# Patient Record
Sex: FEMALE | Race: WHITE | NOT HISPANIC OR LATINO | URBAN - METROPOLITAN AREA
[De-identification: names, ages, dates, MRNs, and addresses within clinical notes are randomized per-mention and may not be internally consistent; named-entity substitution may affect disease eponyms.]

---

## 2023-08-03 LAB
AMPHETAMINE (PRESENCE) IN URINE BY SCREEN METHOD: ABNORMAL
BARBITURATES PRESENCE IN URINE BY SCREEN METHOD: ABNORMAL
BENZODIAZEPINE (PRESENCE) IN URINE BY SCREEN METHOD: ABNORMAL
CANNABINOIDS IN URINE BY SCREEN METHOD: ABNORMAL
COCAINE (PRESENCE) IN URINE BY SCREEN METHOD: ABNORMAL
DRUG SCREEN COMMENT URINE: ABNORMAL
FENTANYL URINE: ABNORMAL
METHADONE (PRESENCE) IN URINE BY SCREEN METHOD: ABNORMAL
OPIATES (PRESENCE) IN URINE BY SCREEN METHOD: ABNORMAL
OXYCODONE (PRESENCE) IN URINE BY SCREEN METHOD: ABNORMAL
PHENCYCLIDINE (PRESENCE) IN URINE BY SCREEN METHOD: ABNORMAL

## 2023-08-04 LAB — ETHYL GLUCURONIDE SCREEN: NEGATIVE NG/ML

## 2023-08-07 LAB
11-NOR-9-CARBOXY-THC, URN, QUANT: >500 NG/ML
BENZOYLECGONINE, URINE: >1000 NG/ML

## 2023-08-08 LAB
6-ACETYLMORPHINE: <25 NG/ML
7-AMINOCLONAZEPAM: <25 NG/ML
ALPHA-HYDROXYALPRAZOLAM: <25 NG/ML
ALPHA-HYDROXYMIDAZOLAM: <25 NG/ML
ALPRAZOLAM: <25 NG/ML
CHLORDIAZEPOXIDE: <25 NG/ML
CLONAZEPAM: <25 NG/ML
CODEINE: <50 NG/ML
DIAZEPAM: <25 NG/ML
HYDROCODONE: <25 NG/ML
HYDROMORPHONE: <25 NG/ML
LORAZEPAM: <25 NG/ML
MIDAZOLAM: <25 NG/ML
MORPHINE URINE: <50 NG/ML
NORDIAZEPAM: <25 NG/ML
NORHYDROCODONE: <25 NG/ML
NOROXYCODONE: <25 NG/ML
OXAZEPAM: <25 NG/ML
OXYCODONE: <25 NG/ML
OXYMORPHONE: <25 NG/ML
TEMAZEPAM: <25 NG/ML

## 2024-02-05 ENCOUNTER — TELEMEDICINE (OUTPATIENT)
Dept: BEHAVIORAL HEALTH | Facility: CLINIC | Age: 44
End: 2024-02-05
Payer: COMMERCIAL

## 2024-02-05 DIAGNOSIS — Z79.899 MEDICATION MANAGEMENT: ICD-10-CM

## 2024-02-05 DIAGNOSIS — F10.21 ALCOHOL USE DISORDER, MODERATE, IN SUSTAINED REMISSION (MULTI): ICD-10-CM

## 2024-02-05 DIAGNOSIS — G47.00 INSOMNIA, UNSPECIFIED TYPE: ICD-10-CM

## 2024-02-05 DIAGNOSIS — F41.1 GAD (GENERALIZED ANXIETY DISORDER): ICD-10-CM

## 2024-02-05 DIAGNOSIS — F33.1 MODERATE EPISODE OF RECURRENT MAJOR DEPRESSIVE DISORDER (MULTI): ICD-10-CM

## 2024-02-05 DIAGNOSIS — F43.10 PTSD (POST-TRAUMATIC STRESS DISORDER): ICD-10-CM

## 2024-02-05 DIAGNOSIS — F17.210 CIGARETTE NICOTINE DEPENDENCE WITHOUT COMPLICATION: ICD-10-CM

## 2024-02-05 DIAGNOSIS — F14.20 COCAINE USE DISORDER, SEVERE, DEPENDENCE (MULTI): ICD-10-CM

## 2024-02-05 PROBLEM — F31.9 BIPOLAR AND RELATED DISORDER (MULTI): Status: ACTIVE | Noted: 2024-02-05

## 2024-02-05 PROCEDURE — 99214 OFFICE O/P EST MOD 30 MIN: CPT | Performed by: NURSE PRACTITIONER

## 2024-02-05 RX ORDER — SERTRALINE HYDROCHLORIDE 50 MG/1
50 TABLET, FILM COATED ORAL DAILY
Qty: 14 TABLET | Refills: 0 | Status: SHIPPED | OUTPATIENT
Start: 2024-02-05 | End: 2024-05-01 | Stop reason: WASHOUT

## 2024-02-05 RX ORDER — DULOXETINE 40 MG/1
80 CAPSULE, DELAYED RELEASE ORAL DAILY
Qty: 180 CAPSULE | Refills: 3 | Status: SHIPPED | OUTPATIENT
Start: 2024-02-05 | End: 2024-05-01 | Stop reason: SDUPTHER

## 2024-02-05 RX ORDER — MIRTAZAPINE 15 MG/1
15 TABLET, FILM COATED ORAL NIGHTLY
Qty: 90 TABLET | Refills: 3 | Status: SHIPPED | OUTPATIENT
Start: 2024-02-05 | End: 2024-05-01 | Stop reason: SDUPTHER

## 2024-02-05 RX ORDER — LAMOTRIGINE 100 MG/1
100 TABLET ORAL 2 TIMES DAILY
Qty: 180 TABLET | Refills: 3 | Status: SHIPPED | OUTPATIENT
Start: 2024-02-05 | End: 2024-05-01 | Stop reason: SDUPTHER

## 2024-02-05 RX ORDER — TRAZODONE HYDROCHLORIDE 50 MG/1
25-100 TABLET ORAL NIGHTLY PRN
Qty: 180 TABLET | Refills: 3 | Status: SHIPPED | OUTPATIENT
Start: 2024-02-05 | End: 2024-05-01 | Stop reason: SDUPTHER

## 2024-02-05 ASSESSMENT — ENCOUNTER SYMPTOMS: CONSTITUTIONAL NEGATIVE: 1
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
## 2024-05-01 ENCOUNTER — TELEMEDICINE (OUTPATIENT)
Dept: BEHAVIORAL HEALTH | Facility: CLINIC | Age: 44
End: 2024-05-01
Payer: COMMERCIAL

## 2024-05-01 DIAGNOSIS — F17.210 CIGARETTE NICOTINE DEPENDENCE WITHOUT COMPLICATION: ICD-10-CM

## 2024-05-01 DIAGNOSIS — F43.10 PTSD (POST-TRAUMATIC STRESS DISORDER): Primary | ICD-10-CM

## 2024-05-01 DIAGNOSIS — F10.21 ALCOHOL USE DISORDER, MODERATE, IN SUSTAINED REMISSION (MULTI): ICD-10-CM

## 2024-05-01 DIAGNOSIS — F33.1 MODERATE EPISODE OF RECURRENT MAJOR DEPRESSIVE DISORDER (MULTI): ICD-10-CM

## 2024-05-01 DIAGNOSIS — F41.1 GAD (GENERALIZED ANXIETY DISORDER): ICD-10-CM

## 2024-05-01 DIAGNOSIS — F14.20 COCAINE USE DISORDER, SEVERE, DEPENDENCE (MULTI): ICD-10-CM

## 2024-05-01 DIAGNOSIS — Z79.899 MEDICATION MANAGEMENT: ICD-10-CM

## 2024-05-01 DIAGNOSIS — G47.00 INSOMNIA, UNSPECIFIED TYPE: ICD-10-CM

## 2024-05-01 PROCEDURE — 99214 OFFICE O/P EST MOD 30 MIN: CPT | Performed by: NURSE PRACTITIONER

## 2024-05-01 PROCEDURE — 4004F PT TOBACCO SCREEN RCVD TLK: CPT | Performed by: NURSE PRACTITIONER

## 2024-05-01 RX ORDER — MIRTAZAPINE 15 MG/1
15 TABLET, FILM COATED ORAL NIGHTLY
Qty: 90 TABLET | Refills: 3 | Status: SHIPPED | OUTPATIENT
Start: 2024-05-01 | End: 2025-05-01

## 2024-05-01 RX ORDER — DULOXETINE 40 MG/1
80 CAPSULE, DELAYED RELEASE ORAL DAILY
Qty: 180 CAPSULE | Refills: 3 | Status: SHIPPED | OUTPATIENT
Start: 2024-05-01 | End: 2025-05-01

## 2024-05-01 RX ORDER — TRAZODONE HYDROCHLORIDE 50 MG/1
25-100 TABLET ORAL NIGHTLY PRN
Qty: 180 TABLET | Refills: 3 | Status: SHIPPED | OUTPATIENT
Start: 2024-05-01 | End: 2025-05-01

## 2024-05-01 RX ORDER — LAMOTRIGINE 100 MG/1
100 TABLET ORAL 2 TIMES DAILY
Qty: 180 TABLET | Refills: 3 | Status: SHIPPED | OUTPATIENT
Start: 2024-05-01 | End: 2025-05-01

## 2024-05-01 ASSESSMENT — ENCOUNTER SYMPTOMS: CONSTITUTIONAL NEGATIVE: 1
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
## 2024-05-06 ENCOUNTER — APPOINTMENT (OUTPATIENT)
Dept: BEHAVIORAL HEALTH | Facility: CLINIC | Age: 44
End: 2024-05-06
Payer: COMMERCIAL

## 2024-06-27 ENCOUNTER — TELEPHONE (OUTPATIENT)
Dept: BEHAVIORAL HEALTH | Facility: CLINIC | Age: 44
End: 2024-06-27

## 2024-07-09 ENCOUNTER — APPOINTMENT (OUTPATIENT)
Dept: BEHAVIORAL HEALTH | Facility: CLINIC | Age: 44
End: 2024-07-09
Payer: COMMERCIAL

## 2024-07-23 ENCOUNTER — OFFICE VISIT (OUTPATIENT)
Dept: ORTHOPEDIC SURGERY | Facility: CLINIC | Age: 44
End: 2024-07-23
Payer: COMMERCIAL

## 2024-07-23 ENCOUNTER — HOSPITAL ENCOUNTER (OUTPATIENT)
Dept: RADIOLOGY | Facility: CLINIC | Age: 44
Discharge: HOME | End: 2024-07-23
Payer: COMMERCIAL

## 2024-07-23 VITALS — BODY MASS INDEX: 22.2 KG/M2 | WEIGHT: 130 LBS | HEIGHT: 64 IN

## 2024-07-23 DIAGNOSIS — M25.561 ACUTE PAIN OF RIGHT KNEE: ICD-10-CM

## 2024-07-23 DIAGNOSIS — M17.11 ARTHRITIS OF RIGHT KNEE: Primary | ICD-10-CM

## 2024-07-23 PROCEDURE — 73562 X-RAY EXAM OF KNEE 3: CPT | Mod: RIGHT SIDE | Performed by: RADIOLOGY

## 2024-07-23 PROCEDURE — 73562 X-RAY EXAM OF KNEE 3: CPT | Mod: RT

## 2024-07-23 PROCEDURE — 99203 OFFICE O/P NEW LOW 30 MIN: CPT

## 2024-07-23 PROCEDURE — 3008F BODY MASS INDEX DOCD: CPT

## 2024-07-23 PROCEDURE — L1812 KO ELASTIC W/JOINTS PRE OTS: HCPCS

## 2024-07-23 PROCEDURE — 20610 DRAIN/INJ JOINT/BURSA W/O US: CPT

## 2024-07-23 PROCEDURE — 4004F PT TOBACCO SCREEN RCVD TLK: CPT

## 2024-07-23 RX ORDER — TRIAMCINOLONE ACETONIDE 40 MG/ML
40 INJECTION, SUSPENSION INTRA-ARTICULAR; INTRAMUSCULAR
Status: COMPLETED | OUTPATIENT
Start: 2024-07-23 | End: 2024-07-23

## 2024-07-23 RX ORDER — LIDOCAINE HYDROCHLORIDE 20 MG/ML
2 INJECTION, SOLUTION INFILTRATION; PERINEURAL
Status: COMPLETED | OUTPATIENT
Start: 2024-07-23 | End: 2024-07-23

## 2024-07-23 ASSESSMENT — ENCOUNTER SYMPTOMS: KNEE SWELLING: 1

## 2024-07-23 NOTE — PROGRESS NOTES
Subjective    Patient ID: Nicci Ortiz is a 44 y.o. female.    Chief Complaint: Pain of the Right Knee     Right Knee       This is a pleasant 44-year-old female presenting to the office for evaluation of right knee pain, which has been ongoing intermittently for approximately 5 to 6 years, worsening over the last few weeks.  Patient states that in the past, she has experienced lateral right knee Subluxation.  She did perform formal physical therapy at that time, and states that she has not had any recent subluxation of her kneecap.  There has been no recent injury.  She points to her kneecap and medial aspect of the knee when describing the pain.  Pain is described as a constant dull ache, sharp shooting at times.  Pain is worse when going up and down stairs, bending or squatting, or kneeling directly onto her knee.  She has been taking ibuprofen and Tylenol as well as applying heat and ice with minimal relief of symptoms.  She does notice intermittent swelling at times.  Denies recent redness or warmth to right knee.  She is an [employer] .    The patient's past medical, surgical, family, and social history as well as allergies and medications were reviewed and updated in the chart.    Objective   Ortho Exam  Pleasant and no acute distress. Walks with a mildly antalgic gait.  Right knee appearing without soft tissue swelling erythema or ecchymosis.  There is no warmth upon touch and skin is intact.  Patellofemoral crepitus no with range of motion testing.  No patellar apprehension.  Right knee range of motion is 0-110°. The knee is stable to varus and valgus stress Lachman and posterior drawer. There is a mild effusion. There is generalized tenderness surrounding kneecap and medial joint line. Left knee range of motion is 0-120°. There is no effusion. The knee is stable to varus and valgus stress Lachman and posterior drawer. Both lower extremities are well perfused the skin is intact and muscle tone is  adequate.    Image Results:  Multiple view x-rays of the right knee obtained today personally reviewed, without evidence of acute fracture or dislocation.  There are degenerative changes of the right knee with joint space narrowing noted in medial and patellofemoral compartments.    Assessment/Plan   Encounter Diagnoses:  Arthritis of right knee    Acute pain of right knee     Plan: Discussion with patient about previous knee subluxation and right knee arthritis with review of today's x-rays.  Conservative treatment options were discussed at length.  She will benefit from a formal physical therapy program to help with shoulder quad strengthening, right knee strengthening and range of motion, referral provided.  After the risks and benefits of a right knee intra-articular steroid injection of Kenalog/lidocaine was discussed to help decrease pain and inflammation, patient agreed to injection and tolerated well.  She is aware that injection could take up to 2 weeks to take full effect.  She can receive these injections every 3 months as needed.  She can continue with ibuprofen and Tylenol as well as heat and ice application as needed.  She was provided a Harjinder pull knee brace to help with knee Stability.  She can follow-up as symptoms dictate.    L Inj/Asp: R knee on 7/23/2024 11:10 AM  Indications: pain  Details: 22 G needle, superolateral approach  Medications: 40 mg triamcinolone acetonide 40 mg/mL; 2 mL lidocaine 20 mg/mL (2 %)  Procedure, treatment alternatives, risks and benefits explained, specific risks discussed. Consent was given by the patient.                Orders Placed This Encounter    Knee brace    XR knee right 3 views    Referral to Physical Therapy     No follow-ups on file.

## 2024-07-29 ENCOUNTER — APPOINTMENT (OUTPATIENT)
Dept: BEHAVIORAL HEALTH | Facility: CLINIC | Age: 44
End: 2024-07-29
Payer: COMMERCIAL

## 2024-07-29 DIAGNOSIS — F31.9 BIPOLAR AND RELATED DISORDER (MULTI): ICD-10-CM

## 2024-07-29 DIAGNOSIS — F43.10 PTSD (POST-TRAUMATIC STRESS DISORDER): ICD-10-CM

## 2024-07-29 PROCEDURE — 99214 OFFICE O/P EST MOD 30 MIN: CPT | Performed by: PSYCHIATRY & NEUROLOGY

## 2024-07-29 RX ORDER — DIVALPROEX SODIUM 250 MG/1
250 TABLET, DELAYED RELEASE ORAL 2 TIMES DAILY
Qty: 60 TABLET | Refills: 2 | Status: SHIPPED | OUTPATIENT
Start: 2024-07-29 | End: 2024-10-27

## 2024-07-29 ASSESSMENT — ENCOUNTER SYMPTOMS: SLEEP DISTURBANCE: 1

## 2024-07-29 NOTE — PROGRESS NOTES
"Subjective   Patient ID: Nicci Ortiz is a 44 y.o. female who presents for med management visit.  HPI Virtual or Telephone Consent    An interactive audio and video telecommunication system which permits real time communications between the patient (at the originating site) and provider (at the distant site) was utilized to provide this telehealth service.   Verbal consent was requested and obtained from Nicci Ortiz on this date, 07/29/24 for a telehealth visit.  Patient resuming care with me.  Patient has been having great difficulty with the \"0-60\" phenomena.  Usually little tiny things trigger major tirade's and she has tried many coping skills meditation etc. to deal with this.  Reviewed interval psych history and meds.  Taking Cymbalta 40 mg twice daily and Lamictal 100 mg twice daily.  Remeron was added for sleep was only helpful for about 3 weeks and is not working now.  Going to [facility] to see if she can do an IOP there.  The thought of using cocaine enters her mind when she is overwhelmed and anxious.  Has not started using cocaine.    Review of Systems   Psychiatric/Behavioral:  Positive for sleep disturbance.        Objective   Physical Exam  Psychiatric:         Attention and Perception: Attention and perception normal.         Mood and Affect: Mood and affect normal.         Speech: Speech normal.         Behavior: Behavior is cooperative.         Thought Content: Thought content normal.         Cognition and Memory: Cognition and memory normal.         Judgment: Judgment normal.         Assessment/Plan   Problem List Items Addressed This Visit             ICD-10-CM    Bipolar and related disorder (Multi) F31.9     Trial Depakote 250 mg twice daily.  This is for the quick anger response to minimal stimuli.  Mirtazapine will be discontinued.  Follow-up 6 weeks         PTSD (post-traumatic stress disorder) F43.10    Relevant Medications    divalproex (Depakote) 250 mg EC tablet            Adama RIVERA" MD Debra 07/29/24 1:45 PM

## 2024-07-29 NOTE — ASSESSMENT & PLAN NOTE
Trial Depakote 250 mg twice daily.  This is for the quick anger response to minimal stimuli.  Mirtazapine will be discontinued.  Follow-up 6 weeks

## 2024-09-09 ENCOUNTER — APPOINTMENT (OUTPATIENT)
Dept: BEHAVIORAL HEALTH | Facility: CLINIC | Age: 44
End: 2024-09-09
Payer: COMMERCIAL

## 2024-10-08 ENCOUNTER — TELEPHONE (OUTPATIENT)
Dept: BEHAVIORAL HEALTH | Facility: CLINIC | Age: 44
End: 2024-10-08
Payer: COMMERCIAL

## 2024-10-08 DIAGNOSIS — G47.00 INSOMNIA, UNSPECIFIED TYPE: ICD-10-CM

## 2024-10-08 RX ORDER — TRAZODONE HYDROCHLORIDE 50 MG/1
25-100 TABLET ORAL NIGHTLY PRN
Qty: 60 TABLET | Refills: 0 | Status: SHIPPED | OUTPATIENT
Start: 2024-10-08 | End: 2024-11-07

## 2024-10-08 NOTE — TELEPHONE ENCOUNTER
----- Message from Kaley Aguilar sent at 10/8/2024  1:57 PM EDT -----  Regarding: trazadone  Buzz Brooks, This is a patient of Dr Richardson who previously saw Quan Jeter. She sent Dr Richardson an email looking for trazadone to help her sleep. Quan had prescribed in the past. Please advise.   Thank you I can call her with your response.

## 2024-10-24 ENCOUNTER — APPOINTMENT (OUTPATIENT)
Dept: ORTHOPEDIC SURGERY | Facility: CLINIC | Age: 44
End: 2024-10-24
Payer: COMMERCIAL

## 2024-10-28 ENCOUNTER — OFFICE VISIT (OUTPATIENT)
Dept: ORTHOPEDIC SURGERY | Facility: CLINIC | Age: 44
End: 2024-10-28
Payer: COMMERCIAL

## 2024-10-28 ENCOUNTER — APPOINTMENT (OUTPATIENT)
Dept: BEHAVIORAL HEALTH | Facility: CLINIC | Age: 44
End: 2024-10-28
Payer: COMMERCIAL

## 2024-10-28 DIAGNOSIS — M54.2 NECK PAIN: ICD-10-CM

## 2024-10-28 DIAGNOSIS — M25.561 ACUTE PAIN OF RIGHT KNEE: ICD-10-CM

## 2024-10-28 DIAGNOSIS — M17.11 ARTHRITIS OF RIGHT KNEE: ICD-10-CM

## 2024-10-28 PROCEDURE — 2500000004 HC RX 250 GENERAL PHARMACY W/ HCPCS (ALT 636 FOR OP/ED)

## 2024-10-28 PROCEDURE — 99213 OFFICE O/P EST LOW 20 MIN: CPT | Mod: 25

## 2024-10-28 PROCEDURE — 20610 DRAIN/INJ JOINT/BURSA W/O US: CPT | Mod: RT

## 2024-10-28 PROCEDURE — 4004F PT TOBACCO SCREEN RCVD TLK: CPT

## 2024-10-28 PROCEDURE — 99213 OFFICE O/P EST LOW 20 MIN: CPT

## 2024-10-28 RX ORDER — METHYLPREDNISOLONE 4 MG/1
TABLET ORAL
Qty: 21 TABLET | Refills: 0 | Status: SHIPPED | OUTPATIENT
Start: 2024-10-28

## 2024-10-28 RX ORDER — TRIAMCINOLONE ACETONIDE 40 MG/ML
40 INJECTION, SUSPENSION INTRA-ARTICULAR; INTRAMUSCULAR
Status: COMPLETED | OUTPATIENT
Start: 2024-10-28 | End: 2024-10-28

## 2024-10-28 RX ORDER — LIDOCAINE HYDROCHLORIDE 20 MG/ML
2 INJECTION, SOLUTION INFILTRATION; PERINEURAL
Status: COMPLETED | OUTPATIENT
Start: 2024-10-28 | End: 2024-10-28

## 2024-10-28 ASSESSMENT — ENCOUNTER SYMPTOMS: KNEE SWELLING: 1

## 2025-02-10 ENCOUNTER — APPOINTMENT (OUTPATIENT)
Dept: BEHAVIORAL HEALTH | Facility: CLINIC | Age: 45
End: 2025-02-10
Payer: COMMERCIAL

## 2025-02-10 DIAGNOSIS — F33.1 MODERATE EPISODE OF RECURRENT MAJOR DEPRESSIVE DISORDER: ICD-10-CM

## 2025-02-10 DIAGNOSIS — G47.00 INSOMNIA, UNSPECIFIED TYPE: ICD-10-CM

## 2025-02-10 DIAGNOSIS — F43.10 PTSD (POST-TRAUMATIC STRESS DISORDER): ICD-10-CM

## 2025-02-10 DIAGNOSIS — F41.1 GAD (GENERALIZED ANXIETY DISORDER): ICD-10-CM

## 2025-02-10 DIAGNOSIS — F31.9 BIPOLAR AND RELATED DISORDER (MULTI): ICD-10-CM

## 2025-02-10 PROCEDURE — 99214 OFFICE O/P EST MOD 30 MIN: CPT | Performed by: PSYCHIATRY & NEUROLOGY

## 2025-02-10 RX ORDER — LAMOTRIGINE 100 MG/1
100 TABLET ORAL 3 TIMES DAILY
Qty: 90 TABLET | Refills: 3 | Status: SHIPPED | OUTPATIENT
Start: 2025-02-10

## 2025-02-10 RX ORDER — DULOXETINE 40 MG/1
80 CAPSULE, DELAYED RELEASE ORAL DAILY
Qty: 180 CAPSULE | Refills: 3 | Status: SHIPPED | OUTPATIENT
Start: 2025-02-10 | End: 2026-02-10

## 2025-02-10 NOTE — PROGRESS NOTES
Initial psychiatric evaluation  Patient ID: Nicci Ortiz is a 44 y.o. female who presents for medication management and establishment of care.  Patient seen through the use of audiovisual interactive technology.  Patient's location-Home.  Provider's location-[place], [place].  Patient also gave verbal permission to participate in telemedicine evaluation on 2/10/2025.    HPI  Patient reports the last time that she was seen by Dr. Richardson was 12/2024.  There is no record of this encounter.  Per chart review, patient last saw Dr. Richardson in July 2024.  She reported initially following with Dr. Richardson since 2010.  She was then transferred to another provider.  Patient subsequently transferred back to Dr. Richardson.  Patient has a diagnosis of bipolar disorder and PTSD.  Patient is on Cymbalta for total of 80 mg p.o. daily.  Patient is also on Lamictal for a total of 200 mg/day.  Patient reports these as ongoing, chronic/maintenance medications.  In the past patient has been on Remeron for sleep.  There was also an indication of starting Depakote for patient however she did not take it and she is currently not on Remeron or Depakote.  Patient is on trazodone for sleep.  She states that she cannot sleep without it.  Patient reports trying various sleep techniques in order to get to sleep and has not been able to.  Patient described a 0-60 phenomenon to Dr. Richardson.  She reports that she will yell and become extremely irritable in a short period of time.  She scares her family members.  She denies any SI/HI.  She is open to medication recommendations and adjustments.      Review of Systems  BH Psych Review of Symptoms  Patient reports her mood as depressed.  She is tearful.  She reports some baseline irritability.  Patient reports sleep with trazodone.  Patient states that the medication knocks her out for approximately 6 to 7 hours.  Otherwise patient has difficulty falling asleep.  Appetite is increased.  Patient reports that  she has gained approximately 10 pounds over the past 6 months.  She presents with a positive attitude towards healthy weight gain.  Patient reports her energy level as decreased.  She reports feeling guilty that she is not able to do things around the house.  Patient states that bipolar like symptoms with mood lability, irritability, decreased sleep- these are frequent.  She denies any SI/intent/plan.  She denies any homicidal ideations.  No perceptual disturbances in the form of auditory or visual hallucinations and no paranoid ideation/delusions.    Past psychiatric history is significant for PTSD, bipolar disorder, insomnia, polysubstance use disorder in remission.  Patient reports 1 psychiatric hospitalization and 1 suicide attempt.  She denies any self-harm behaviors.  She reports psychotherapy after her mother passed away.  She also reports having IOP but not being able to complete secondary to financial constraints.    Past medical history is noncontributory per patient    Medications-see above    Allergies-reviewed with patient    Chemical dependency history patient reports that she is in recovery from cocaine use.  She denies any alcohol use.  She reports history of THC and tobacco use.  Patient also uses caffeinated sodas.    Social history-patient reports that she lives with her .  She has an 11-year-old daughter.  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  Patient reports her support as low.  She does not have a sponsor.  She reports being outside of the home as a coping strategy.  She states when she is able to spend time out of the home with her daughter she is in a good place.  Otherwise support is overall limited.    Objective   Physical Exam N/AA  Mental status exam  Appearance-appropriate for setting.  Engaged in evaluation.  Eye contact intact.  Mildly tearful.  Behavior-calm, cooperative.  No psychomotor agitation or slowing  Mood-mildly depressed  Affect-reactive, jovial  Speech-regular rate rhythm and  tone  Thought gwwdyab-bnqs-icsmqrqq, linear  Thought content-no SI/HI, no perceptual disturbances, no paranoid ideations or delusions outside of a social setting.  Cognition-grossly intact  Insight-Limited to fair  Judgment/limited to fair    Labs-no new labs    Assessment/Plan   Diagnoses and all orders for this visit:  Insomnia, unspecified type  -     Referral to Psychology; Future   -Continue trazodone as prescribed-current dose  PTSD (post-traumatic stress disorder)  -     lamoTRIgine (LaMICtal) 100 mg tablet; Take 1 tablet (100 mg) by mouth 3 times a day.  -     Referral to Psychology; Future  -     DULoxetine 40 mg DR capsule; Take 2 capsules (80 mg) by mouth once daily.    Bipolar and related disorder (Multi)   -Increase Lamictal to 100 mg p.o. 3 times daily    GEORGE (generalized anxiety disorder)  -     Referral to Psychology; Future  -     DULoxetine 40 mg DR capsule; Take 2 capsules (80 mg) by mouth once daily.    Moderate episode of recurrent major depressive disorder  -     lamoTRIgine (LaMICtal) 100 mg tablet; Take 1 tablet (100 mg) by mouth 3 times a day.  -     Referral to Psychology; Future  -     DULoxetine 40 mg DR capsule; Take 2 capsules (80 mg) by mouth once daily.   -Continue trazodone as prescribed a current dose    Return to [facility] in approximately 3 weeks

## 2025-03-17 ENCOUNTER — APPOINTMENT (OUTPATIENT)
Facility: CLINIC | Age: 45
End: 2025-03-17
Payer: COMMERCIAL

## 2025-03-17 VITALS
OXYGEN SATURATION: 98 % | RESPIRATION RATE: 18 BRPM | HEART RATE: 76 BPM | HEIGHT: 64 IN | SYSTOLIC BLOOD PRESSURE: 113 MMHG | WEIGHT: 118 LBS | BODY MASS INDEX: 20.14 KG/M2 | DIASTOLIC BLOOD PRESSURE: 67 MMHG

## 2025-03-17 DIAGNOSIS — J41.8 MIXED SIMPLE AND MUCOPURULENT CHRONIC BRONCHITIS (MULTI): Primary | ICD-10-CM

## 2025-03-17 DIAGNOSIS — F17.218 CIGARETTE NICOTINE DEPENDENCE WITH OTHER NICOTINE-INDUCED DISORDER: ICD-10-CM

## 2025-03-17 PROCEDURE — 3008F BODY MASS INDEX DOCD: CPT | Performed by: INTERNAL MEDICINE

## 2025-03-17 PROCEDURE — 99204 OFFICE O/P NEW MOD 45 MIN: CPT | Performed by: INTERNAL MEDICINE

## 2025-03-17 RX ORDER — ALBUTEROL SULFATE 90 UG/1
INHALANT RESPIRATORY (INHALATION)
COMMUNITY
Start: 2025-03-04

## 2025-03-17 RX ORDER — ALBUTEROL SULFATE 90 UG/1
1 INHALANT RESPIRATORY (INHALATION) ONCE
OUTPATIENT
Start: 2025-03-17

## 2025-03-17 RX ORDER — ALBUTEROL SULFATE 0.83 MG/ML
3 SOLUTION RESPIRATORY (INHALATION) ONCE
OUTPATIENT
Start: 2025-03-17 | End: 2025-03-17

## 2025-03-17 ASSESSMENT — COPD QUESTIONNAIRES
QUESTION6_LEAVINGHOUSE: 0 - I AM CONFIDENT LEAVING MY HOME DESPITE MY LUNG CONDITION
QUESTION1_COUGHFREQUENCY: 4
QUESTION5_HOMEACTIVITIES: 3
CAT_TOTALSCORE: 20
QUESTION3_CHESTTIGHTNESS: 0 - MY CHEST DOES NOT FEEL TIGHT AT ALL
QUESTION2_CHESTPHLEGM: 3
QUESTION4_WALKINCLINE: 2
QUESTION8_ENERGYLEVEL: 3
QUESTION7_SLEEPQUALITY: 5 - I DON'T SLEEP SOUNDLY BECAUSE OF MY LUNG CONDITION

## 2025-03-17 ASSESSMENT — PATIENT HEALTH QUESTIONNAIRE - PHQ9
SUM OF ALL RESPONSES TO PHQ9 QUESTIONS 1 AND 2: 2
10. IF YOU CHECKED OFF ANY PROBLEMS, HOW DIFFICULT HAVE THESE PROBLEMS MADE IT FOR YOU TO DO YOUR WORK, TAKE CARE OF THINGS AT HOME, OR GET ALONG WITH OTHER PEOPLE: SOMEWHAT DIFFICULT
2. FEELING DOWN, DEPRESSED OR HOPELESS: SEVERAL DAYS
1. LITTLE INTEREST OR PLEASURE IN DOING THINGS: SEVERAL DAYS

## 2025-03-17 ASSESSMENT — COLUMBIA-SUICIDE SEVERITY RATING SCALE - C-SSRS
2. HAVE YOU ACTUALLY HAD ANY THOUGHTS OF KILLING YOURSELF?: NO
1. IN THE PAST MONTH, HAVE YOU WISHED YOU WERE DEAD OR WISHED YOU COULD GO TO SLEEP AND NOT WAKE UP?: NO
6. HAVE YOU EVER DONE ANYTHING, STARTED TO DO ANYTHING, OR PREPARED TO DO ANYTHING TO END YOUR LIFE?: NO

## 2025-03-17 NOTE — PROGRESS NOTES
Department of Medicine  Division of Pulmonary, Critical Care, and Sleep Medicine  Consultation  [facility] - [address], [address]    I was asked by Dr. Milton, to evaluate Ms. Nicci Ortiz for chronic bronchitis. I have independently interviewed and examined the patient in the office and reviewed available records.    Physician HPI:  Mrs. Ortiz is a 45-year-old woman with past medical history of nicotine dependence (25-pack-year history) who presented to the office today to publish care for a chronic bronchitis.  She recently presented to [facility] clinic with acute shortness of breath, cough and wheezing where she was told to have pneumonia and was treated with antibiotic and a short course of corticosteroids.  She was prescribed albuterol to use as needed.  She reports long history of chronic bronchitis over the past 15 years.  She is currently smoking half a pack of cigarettes every day.  Respiratory status has improved after corticosteroids.  She would need to use albuterol a few times per week but not every day.  Denies purulent sputum production recurrent fevers.  No acute chest pain.  Her resting room air O2 saturation is 98%.    Review of Systems   All other review of systems are negative and/or non-contributory.      Immunizations:  Immunization History   Administered Date(s) Administered    COVID-19, mRNA, LNP-S, PF, 30 mcg/0.3 mL dose 05/16/2021, 06/06/2021    Flu vaccine (IIV4), preservative free *Check age/dose* 10/02/2018, 10/17/2019    Flu vaccine, quadrivalent, no egg protein, age 6 month or greater (FLUCELVAX) 10/31/2023    Influenza, Unspecified 10/22/2019    Influenza, injectable, quadrivalent 09/20/2021    MMR vaccine, subcutaneous (MMR II) 02/28/2022    Pfizer Purple Cap SARS-CoV-2 12/23/2021    Tdap vaccine, age 7 year and older (BOOSTRIX, ADACEL) 05/29/2013, 06/30/2022       Past Medical History:  No past medical history on file.    Past Surgical History:  No past surgical history on  "file.    Family History:  No family history on file.    Social History:  Social History     Tobacco Use    Smoking status: Every Day     Current packs/day: 1.00     Average packs/day: 1 pack/day for 27.0 years (27.0 ttl pk-yrs)     Types: Cigarettes    Smokeless tobacco: Never   Vaping Use    Vaping status: Never Used   Substance Use Topics    Alcohol use: Never    Drug use: Never        Occupational & Environmental History:   [employer]     Medications:  Current Outpatient Medications   Medication Instructions    albuterol 90 mcg/actuation inhaler INHALE 2 PUFFS EVERY 4 HOURS AS NEEDED FOR WHEEZING FOR UP TO 30 DAYS.    DULoxetine 80 mg, oral, Daily    lamoTRIgine (LAMICTAL) 100 mg, oral, 3 times daily    methylPREDNISolone (Medrol Dospak) 4 mg tablets Use as directed by package instructions    tiotropium-olodateroL (Stiolto Respimat) 2.5-2.5 mcg/actuation mist inhaler 2 Inhalations, inhalation, Daily    traZODone (DESYREL)  mg, oral, Nightly PRN        Drug Allergies/Intolerances:  Allergies   Allergen Reactions    Amoxicillin Hives    Fentanyl Nausea/vomiting     Muscle weakness/spasm    Other reaction(s): Other (See Comments)   Muscle weakness/spasm    Moxifloxacin GI Upset     Other reaction(s): GI Upset   Yeast infection    Yeast infection    Risperidone Analogues GI Upset and Other     muscle seizing    Other reaction(s): GI Intolerance   Muscle spams    Muscle spams            Visit Vitals  /67   Pulse 76   Resp 18   Ht 1.626 m (5' 4\")   Wt 53.5 kg (118 lb)   LMP 03/03/2025   SpO2 98%   BMI 20.25 kg/m²   OB Status Having periods   Smoking Status Every Day   BSA 1.55 m²        Physical Exam  Vitals and nursing note reviewed.   Constitutional:       General: She is not in acute distress.  HENT:      Head: Normocephalic and atraumatic.      Mouth/Throat:      Pharynx: Oropharynx is clear.      Comments: No thrush  Cardiovascular:      Rate and Rhythm: Normal rate.   Pulmonary:      " "Effort: Pulmonary effort is normal. No respiratory distress.      Breath sounds: No stridor. Wheezing present. No rhonchi.   Neurological:      General: No focal deficit present.      Mental Status: She is alert and oriented to person, place, and time. Mental status is at baseline.          Pulmonary Function Test Results       Chest Radiograph     No results found for this or any previous visit from the past 2000 days.      Echocardiogram     No results found for this or any previous visit from the past 365 days.       Chest CT Scan     No results found for this or any previous visit from the past 365 days.       Laboratory Studies     Lab Results   Component Value Date    WBC 6.8 02/07/2019    HGB 13.2 02/07/2019    HCT 39.6 02/07/2019    MCV 95 02/07/2019     (H) 02/07/2019      Lab Results   Component Value Date    GLUCOSE 81 02/07/2019    CALCIUM 9.4 02/07/2019     02/07/2019    K 3.1 (L) 02/07/2019    CO2 29 02/07/2019     02/07/2019    BUN 6 02/07/2019    CREATININE 0.66 02/07/2019      No results found for: \"ALT\", \"AST\", \"GGT\", \"ALKPHOS\", \"BILITOT\"     Protime   Date/Time Value Ref Range Status   02/07/2019 07:15 PM 11.4 9.7 - 12.7 sec Final     INR   Date/Time Value Ref Range Status   02/07/2019 07:15 PM 1.0 0.9 - 1.1 Final         Assessment and Plan / Recommendations     COPD w/ symptoms of chronic bronchitis - no PFTs in the records  Nicotine dependence    Plan:  Complete PFTs  Start LABA/LAMA + Albuterol as needed. A sample of Stiolto is provided today.  Smoking cessation counseling is done today  CXR PA/Lateral  Follow up in 3-4 months     Please excuse any misspellings or unintended errors related to the Dragon speech recognition software used to dictate this note.    Nikole Arceo MD  03/17/2025  "

## 2025-03-18 LAB
ALBUMIN SERPL-MCNC: 4.2 G/DL (ref 3.6–5.1)
ALP SERPL-CCNC: 57 U/L (ref 31–125)
ALT SERPL-CCNC: 10 U/L (ref 6–29)
ANION GAP SERPL CALCULATED.4IONS-SCNC: 9 MMOL/L (CALC) (ref 7–17)
AST SERPL-CCNC: 18 U/L (ref 10–35)
BASOPHILS # BLD AUTO: 49 CELLS/UL (ref 0–200)
BASOPHILS NFR BLD AUTO: 1 %
BILIRUB SERPL-MCNC: 0.4 MG/DL (ref 0.2–1.2)
BUN SERPL-MCNC: 10 MG/DL (ref 7–25)
CALCIUM SERPL-MCNC: 9 MG/DL (ref 8.6–10.2)
CHLORIDE SERPL-SCNC: 103 MMOL/L (ref 98–110)
CO2 SERPL-SCNC: 28 MMOL/L (ref 20–32)
CREAT SERPL-MCNC: 0.6 MG/DL (ref 0.5–0.99)
EGFRCR SERPLBLD CKD-EPI 2021: 113 ML/MIN/1.73M2
EOSINOPHIL # BLD AUTO: 69 CELLS/UL (ref 15–500)
EOSINOPHIL NFR BLD AUTO: 1.4 %
ERYTHROCYTE [DISTWIDTH] IN BLOOD BY AUTOMATED COUNT: 16 % (ref 11–15)
GLUCOSE SERPL-MCNC: 93 MG/DL (ref 65–99)
HCT VFR BLD AUTO: 41 % (ref 35–45)
HGB BLD-MCNC: 12.8 G/DL (ref 11.7–15.5)
LYMPHOCYTES # BLD AUTO: 1441 CELLS/UL (ref 850–3900)
LYMPHOCYTES NFR BLD AUTO: 29.4 %
MCH RBC QN AUTO: 30 PG (ref 27–33)
MCHC RBC AUTO-ENTMCNC: 31.2 G/DL (ref 32–36)
MCV RBC AUTO: 96 FL (ref 80–100)
MONOCYTES # BLD AUTO: 485 CELLS/UL (ref 200–950)
MONOCYTES NFR BLD AUTO: 9.9 %
NEUTROPHILS # BLD AUTO: 2857 CELLS/UL (ref 1500–7800)
NEUTROPHILS NFR BLD AUTO: 58.3 %
PLATELET # BLD AUTO: 414 THOUSAND/UL (ref 140–400)
PMV BLD REES-ECKER: 8.8 FL (ref 7.5–12.5)
POTASSIUM SERPL-SCNC: 4.2 MMOL/L (ref 3.5–5.3)
PROT SERPL-MCNC: 6.4 G/DL (ref 6.1–8.1)
RBC # BLD AUTO: 4.27 MILLION/UL (ref 3.8–5.1)
SODIUM SERPL-SCNC: 140 MMOL/L (ref 135–146)
TSH SERPL-ACNC: 1.36 MIU/L
WBC # BLD AUTO: 4.9 THOUSAND/UL (ref 3.8–10.8)

## 2025-04-02 ENCOUNTER — APPOINTMENT (OUTPATIENT)
Dept: RESPIRATORY THERAPY | Facility: HOSPITAL | Age: 45
End: 2025-04-02
Payer: COMMERCIAL

## 2025-04-15 ENCOUNTER — OFFICE VISIT (OUTPATIENT)
Dept: ORTHOPEDIC SURGERY | Facility: CLINIC | Age: 45
End: 2025-04-15
Payer: COMMERCIAL

## 2025-04-15 VITALS — HEIGHT: 64 IN | BODY MASS INDEX: 20.49 KG/M2 | WEIGHT: 120 LBS

## 2025-04-15 DIAGNOSIS — M25.561 ACUTE PAIN OF RIGHT KNEE: ICD-10-CM

## 2025-04-15 DIAGNOSIS — M17.11 ARTHRITIS OF RIGHT KNEE: Primary | ICD-10-CM

## 2025-04-15 PROCEDURE — 99213 OFFICE O/P EST LOW 20 MIN: CPT

## 2025-04-15 PROCEDURE — 2500000004 HC RX 250 GENERAL PHARMACY W/ HCPCS (ALT 636 FOR OP/ED)

## 2025-04-15 PROCEDURE — 3008F BODY MASS INDEX DOCD: CPT

## 2025-04-15 PROCEDURE — 20610 DRAIN/INJ JOINT/BURSA W/O US: CPT | Mod: RT

## 2025-04-15 PROCEDURE — 99213 OFFICE O/P EST LOW 20 MIN: CPT | Mod: 25

## 2025-04-15 RX ORDER — CELECOXIB 50 MG/1
50 CAPSULE ORAL 2 TIMES DAILY
COMMUNITY

## 2025-04-15 RX ADMIN — TRIAMCINOLONE ACETONIDE 40 MG: 400 INJECTION, SUSPENSION INTRA-ARTICULAR; INTRAMUSCULAR at 08:54

## 2025-04-15 RX ADMIN — LIDOCAINE HYDROCHLORIDE 2 ML: 20 INJECTION, SOLUTION INFILTRATION; PERINEURAL at 08:54

## 2025-04-16 RX ORDER — TRIAMCINOLONE ACETONIDE 40 MG/ML
40 INJECTION, SUSPENSION INTRA-ARTICULAR; INTRAMUSCULAR
Status: COMPLETED | OUTPATIENT
Start: 2025-04-15 | End: 2025-04-15

## 2025-04-16 RX ORDER — LIDOCAINE HYDROCHLORIDE 20 MG/ML
2 INJECTION, SOLUTION INFILTRATION; PERINEURAL
Status: COMPLETED | OUTPATIENT
Start: 2025-04-15 | End: 2025-04-15

## 2025-04-16 ASSESSMENT — ENCOUNTER SYMPTOMS: KNEE SWELLING: 1

## 2025-04-16 NOTE — PROGRESS NOTES
Subjective    Patient ID: Nicci Ortiz is a 45 y.o. female.    Chief Complaint: Follow-up of the Right Knee    Right Knee       This is a pleasant 45-year-old female presenting to the office for follow-up in regards to right knee pain.  She has a history of right knee arthritis.  There is been no recent injury or fall.  I last saw patient in October 2024 when she received a right knee intra-articular steroid injection of Kenalog/lidocaine which was of benefit up until the last 3 weeks or so.  She points directly to her knee And medial aspect of the knee when describing her pain.  Pain is described as a constant dull ache, sharp shooting at times.  Pain is worse with activities of daily living including prolonged standing walking and stair climbing.  Pain is worse when going up and down steps bending or squatting.  She will notice intermittent swelling and weakness.  She will take ibuprofen and Tylenol as well as apply ice with minimal relief of symptoms.  She works as an [employer] .    The patient's past medical, surgical, family, and social history as well as allergies and medications were reviewed and updated in the chart.    Objective   Ortho Exam  Pleasant in no acute distress. Walks with a mildly antalgic gait. Right knee appearing with minimal soft tissue swelling, no erythema or ecchymosis. There is a mild effusion. There is no warmth upon touch and skin is intact. Patellofemoral crepitus noted with range of motion testing. No patellar apprehension. Right knee range of motion is approximately 0 to 120 degrees. The knee is stable to varus and valgus stress, negative Lachman and posterior drawer. There is generalized tenderness surrounding the kneecap and medial joint line. Left knee range of motion is 0 to 120 degrees. There is no effusion. The knee is stable to varus and valgus stress Lachman and posterior drawer. Both lower extremities are well-perfused the skin is intact and muscle tone is  adequate.     Image Results:  XR knee right 3 views  Narrative: Interpreted By:  Dulce Sherman,   STUDY:  Right knee, 3 views.      INDICATION:  Signs/Symptoms:right knee pain      COMPARISON:  02/22/2016.      ACCESSION NUMBER(S):  [id number]      ORDERING CLINICIAN:  RONNI QUINTEROS      FINDINGS:  No acute fracture or malalignment.  Joint spaces are well preserved.      No significant knee joint effusion.  Soft tissues are unremarkable.      AP view radiographs of the left knee are unremarkable.      Impression: 1. Unremarkable right knee radiographs.      MACRO:  None.      Signed by: Dulce Sherman 7/24/2024 5:51 PM  Dictation workstation:   [id number]      Assessment/Plan   Encounter Diagnoses:  Arthritis of right knee    Acute pain of right knee    Plan: Discussion with patient in regards to continued right knee arthritis with review of conservative and surgical treatment options.  As she has benefited in the past from conservative treatment options, patient desired a right knee intra-articular steroid injection today which I provided and tolerated well.  She can get these injections every 3 months as needed for pain returns.  I also tried on a Harjinder pull brace for patient today to help with knee Stability, but after we applied brace, patient expressed increased uncomfortableness.  We decided to forego use of Harjinder pull brace today.  She will continue with compression knee sleeve, anti-inflammatories and Tylenol as well as ice application.  She will avoid aggravating activities and follow-up as symptoms dictate.    L Inj/Asp: R knee on 4/15/2025 8:54 AM  Indications: pain  Details: 22 G needle, superolateral approach  Medications: 40 mg triamcinolone acetonide 40 mg/mL; 2 mL lidocaine 20 mg/mL (2 %)  Procedure, treatment alternatives, risks and benefits explained, specific risks discussed. Consent was given by the patient.

## 2025-04-29 ENCOUNTER — HOSPITAL ENCOUNTER (OUTPATIENT)
Dept: RESPIRATORY THERAPY | Facility: HOSPITAL | Age: 45
Discharge: HOME | End: 2025-04-29
Payer: COMMERCIAL

## 2025-04-29 DIAGNOSIS — J41.8 MIXED SIMPLE AND MUCOPURULENT CHRONIC BRONCHITIS (MULTI): ICD-10-CM

## 2025-04-29 LAB
MGC ASCENT PFT - FEV1 - POST: 1.43
MGC ASCENT PFT - FEV1 - PRE: 1.3
MGC ASCENT PFT - FEV1 - PREDICTED: 2.75
MGC ASCENT PFT - FVC - POST: 3.24
MGC ASCENT PFT - FVC - PRE: 3.18
MGC ASCENT PFT - FVC - PREDICTED: 3.34

## 2025-04-29 PROCEDURE — 2500000001 HC RX 250 WO HCPCS SELF ADMINISTERED DRUGS (ALT 637 FOR MEDICARE OP): Performed by: INTERNAL MEDICINE

## 2025-04-29 PROCEDURE — 94726 PLETHYSMOGRAPHY LUNG VOLUMES: CPT

## 2025-04-29 RX ORDER — ALBUTEROL SULFATE 90 UG/1
1 INHALANT RESPIRATORY (INHALATION) ONCE
Status: COMPLETED | OUTPATIENT
Start: 2025-04-29 | End: 2025-04-29

## 2025-04-29 RX ORDER — ALBUTEROL SULFATE 0.83 MG/ML
3 SOLUTION RESPIRATORY (INHALATION) ONCE
Status: COMPLETED | OUTPATIENT
Start: 2025-04-29 | End: 2025-04-29

## 2025-04-29 RX ADMIN — ALBUTEROL SULFATE 1 PUFF: 90 AEROSOL, METERED RESPIRATORY (INHALATION) at 08:20

## 2025-05-05 ENCOUNTER — APPOINTMENT (OUTPATIENT)
Dept: BEHAVIORAL HEALTH | Facility: CLINIC | Age: 45
End: 2025-05-05
Payer: COMMERCIAL

## 2025-05-05 DIAGNOSIS — F41.1 GAD (GENERALIZED ANXIETY DISORDER): ICD-10-CM

## 2025-05-05 DIAGNOSIS — F33.1 MODERATE EPISODE OF RECURRENT MAJOR DEPRESSIVE DISORDER: ICD-10-CM

## 2025-05-05 DIAGNOSIS — F43.10 PTSD (POST-TRAUMATIC STRESS DISORDER): ICD-10-CM

## 2025-05-05 PROCEDURE — 99214 OFFICE O/P EST MOD 30 MIN: CPT | Performed by: PSYCHIATRY & NEUROLOGY

## 2025-05-05 RX ORDER — DULOXETINE 40 MG/1
120 CAPSULE, DELAYED RELEASE ORAL DAILY
Qty: 360 CAPSULE | Refills: 3 | Status: SHIPPED | OUTPATIENT
Start: 2025-05-05

## 2025-05-05 RX ORDER — HYDROXYZINE HYDROCHLORIDE 10 MG/1
10 TABLET, FILM COATED ORAL 3 TIMES DAILY PRN
Qty: 45 TABLET | Refills: 3 | Status: SHIPPED | OUTPATIENT
Start: 2025-05-05

## 2025-05-05 NOTE — PROGRESS NOTES
Subjective   Patient ID: Nicci Ortiz is a 45 y.o. female who presents for follow-up for anxiety, depression.  Patient seen through the use of interactive audiovisual technology.  Patient's location-[place], [place].  Providers location-[place], [place].  Patient also gave verbal permission to participate in psychiatric evaluation via telemedicine on 5/5/2025.  HPI  Patient reports that she is doing okay today.  She reported that she was feeling good this morning.  Patient stated that she had also had periods of sadness where she did not want to do anything.  She reported irritability and feeling angry or mad real fast.  Patient reported stressful situations with a family member recently passing away.  She reported feeling as though a lot of family related responsibilities were falling on her causing stress and anxiety.  In the past, patient has been on Xanax but overly sedated.  Cymbalta was increased to 120 mg p.o. daily.  Patient reports being on this dose for a few days.  Patient also reported decreased sleep without trazodone.  She reported taking 50 to 75 mg.   Tearfulness.     Patient denied any new medical problems or change in medications.  She reports that she has been following with pulmonology and has plans to quit tobacco use.  Review of Systems   Psych Review of Symptoms  Patient denied any SI/HI.  No A/V hallucinations.  No paranoid ideation/delusions.  Patient reported feeling safe overall.  Other psychiatric review of symptoms see above otherwise negative  Objective   Physical Exam  Mental status exam  Appearance-appropriate for setting.  Engaged in evaluation.  Eye contact intact.  Mildly tearful.  Behavior-calm, cooperative.  No psychomotor agitation or slowing  Mood-mildly depressed  Affect-reactive  Speech-regular rate rhythm and tone  Thought dpcmuci-jqki-jgfnvnrv, linear, mildly ruminative  Thought content-no SI/HI, no perceptual disturbances, no paranoid ideations or delusions outside of a social  setting.  Cognition-grossly intact  Insight-Limited to fair  Judgment/limited to fair    Current Medications[1]    Assessment/Plan   Diagnoses and all orders for this visit:  PTSD (post-traumatic stress disorder)  -     DULoxetine 40 mg DR capsule; Take 3 capsules (120 mg) by mouth once daily.  GEORGE (generalized anxiety disorder)  -     DULoxetine 40 mg DR capsule; Take 3 capsules (120 mg) by mouth once daily.  -     hydrOXYzine HCL (Atarax) 10 mg tablet; Take 1 tablet (10 mg) by mouth 3 times a day as needed for anxiety.  Moderate episode of recurrent major depressive disorder  -     DULoxetine 40 mg DR capsule; Take 3 capsules (120 mg) by mouth once daily.     Return to clinic in 1 month       [1]   Current Outpatient Medications   Medication Sig Dispense Refill    albuterol 90 mcg/actuation inhaler INHALE 2 PUFFS EVERY 4 HOURS AS NEEDED FOR WHEEZING FOR UP TO 30 DAYS.      celecoxib (CeleBREX) 50 mg capsule Take 1 capsule (50 mg) by mouth 2 times a day.      DULoxetine 40 mg DR capsule Take 3 capsules (120 mg) by mouth once daily. 360 capsule 3    hydrOXYzine HCL (Atarax) 10 mg tablet Take 1 tablet (10 mg) by mouth 3 times a day as needed for anxiety. 45 tablet 3    lamoTRIgine (LaMICtal) 100 mg tablet Take 1 tablet (100 mg) by mouth 3 times a day. 90 tablet 3    methylPREDNISolone (Medrol Dospak) 4 mg tablets Use as directed by package instructions 21 tablet 0    tiotropium-olodateroL (Stiolto Respimat) 2.5-2.5 mcg/actuation mist inhaler Inhale 2 Inhalations once daily. 4 g 6    traZODone (Desyrel) 50 mg tablet Take 0.5-2 tablets ( mg) by mouth as needed at bedtime for sleep. 60 tablet 0     No current facility-administered medications for this visit.

## 2025-05-27 ENCOUNTER — TELEPHONE (OUTPATIENT)
Dept: BEHAVIORAL HEALTH | Facility: CLINIC | Age: 45
End: 2025-05-27
Payer: COMMERCIAL

## 2025-05-27 DIAGNOSIS — F41.1 GAD (GENERALIZED ANXIETY DISORDER): Primary | ICD-10-CM

## 2025-05-27 DIAGNOSIS — F43.10 PTSD (POST-TRAUMATIC STRESS DISORDER): ICD-10-CM

## 2025-05-27 DIAGNOSIS — F33.1 MODERATE EPISODE OF RECURRENT MAJOR DEPRESSIVE DISORDER: ICD-10-CM

## 2025-05-28 RX ORDER — DULOXETIN HYDROCHLORIDE 60 MG/1
60 CAPSULE, DELAYED RELEASE ORAL 2 TIMES DAILY
Qty: 60 CAPSULE | Refills: 6 | Status: SHIPPED | OUTPATIENT
Start: 2025-05-28

## 2025-05-28 NOTE — TELEPHONE ENCOUNTER
Requested Prescriptions     Signed Prescriptions Disp Refills    DULoxetine (Cymbalta) 60 mg DR capsule 60 capsule 6     Sig: Take 1 capsule (60 mg) by mouth 2 times a day. Do not crush or chew.     Authorizing Provider: CHELSEA GEIGER

## 2025-06-10 ENCOUNTER — APPOINTMENT (OUTPATIENT)
Dept: BEHAVIORAL HEALTH | Facility: HOSPITAL | Age: 45
End: 2025-06-10
Payer: COMMERCIAL

## 2025-06-30 ENCOUNTER — APPOINTMENT (OUTPATIENT)
Dept: BEHAVIORAL HEALTH | Facility: CLINIC | Age: 45
End: 2025-06-30
Payer: COMMERCIAL

## 2025-06-30 DIAGNOSIS — F41.1 GAD (GENERALIZED ANXIETY DISORDER): ICD-10-CM

## 2025-06-30 DIAGNOSIS — F33.1 MODERATE EPISODE OF RECURRENT MAJOR DEPRESSIVE DISORDER: ICD-10-CM

## 2025-06-30 DIAGNOSIS — F43.10 PTSD (POST-TRAUMATIC STRESS DISORDER): Primary | ICD-10-CM

## 2025-06-30 RX ORDER — BUSPIRONE HYDROCHLORIDE 5 MG/1
5 TABLET ORAL 3 TIMES DAILY
Qty: 90 TABLET | Refills: 11 | Status: SHIPPED | OUTPATIENT
Start: 2025-06-30 | End: 2026-06-30

## 2025-06-30 NOTE — PROGRESS NOTES
Subjective   Patient ID: Nicci Ortiz is a 45 y.o. female who presents for follow-up for anxiety, depression.  Patient seen through the use of interactive audiovisual technology.  Patient's location-[place], [place].  Providers location-[place], [place].  Patient also gave verbal permission to participate in psychiatric evaluation via telemedicine on 06/30/2025.  HPI  Patient reports that she is doing okay today.  Patient reports she is having difficulty with anger.  She states that she feels that she is short fused without specific triggers.  She states that this has been ongoing for a couple of weeks.  Reviewed patient's medications she is currently on Cymbalta 120 mg p.o. every morning and Lamictal 100 mg p.o. 3 times daily.  Patient reports the Atarax/hydroxyzine is not working.  She also reports feeling as though she is dependent upon trazodone.  Discussed as needed dosing of trazodone.  Patient states that she will try this.  Patient reports that concentration is intact.  She states that she can get overwhelmed some days but most days she is okay.  She denied any difficulties with appetite or energy level.  Also discussed addition of BuSpar and patient is amenable to this.    Patient denied any new medical problems or change in medications.  She recently got a [facility] PCP through [facility].        Review of Systems   Psych Review of Symptoms  Patient denied any SI/HI.  No A/V hallucinations.  No paranoid ideation/delusions.  Patient reported feeling safe overall.  Other psychiatric review of symptoms see above otherwise negative  Objective   Physical Exam  Mental status exam  Appearance-appropriate for setting.  Engaged in evaluation.  Eye contact intact.  Smiling behavior-calm, cooperative.  No psychomotor agitation or slowing  Mood-euthymic  Affect-reactive, mood congruent  Speech-regular rate rhythm and tone  Thought rrnjqxn-kslf-jdhiveqn, linear, mildly ruminative  Thought content-no SI/HI, no perceptual disturbances, no  paranoid ideations  Cognition-grossly intact  Insight-fair  Judgment fair    Current Medications[1]          Assessment/Plan   Diagnoses and all orders for this visit:  PTSD (post-traumatic stress disorder)  -     DULoxetine 40 mg DR capsule; Take 3 capsules (120 mg) by mouth once daily.  GEORGE (generalized anxiety disorder)  -     DULoxetine 120 mg by mouth once daily.  -     Discontinue/Hold hydrOXYzine HCL (Atarax) 10 mg tablet; Take 1 tablet (10 mg) by mouth 3 times a day as needed for anxiety.  Moderate episode of recurrent major depressive disorder  -     DULoxetine 120 mg by mouth once daily.  -  start buspar 5 mg po TID     Return to clinic in 1 month         [1]   Current Outpatient Medications   Medication Sig Dispense Refill    albuterol 90 mcg/actuation inhaler INHALE 2 PUFFS EVERY 4 HOURS AS NEEDED FOR WHEEZING FOR UP TO 30 DAYS.      celecoxib (CeleBREX) 50 mg capsule Take 1 capsule (50 mg) by mouth 2 times a day.      DULoxetine (Cymbalta) 60 mg DR capsule Take 1 capsule (60 mg) by mouth 2 times a day. Do not crush or chew. 60 capsule 6    hydrOXYzine HCL (Atarax) 10 mg tablet Take 1 tablet (10 mg) by mouth 3 times a day as needed for anxiety. 45 tablet 3    lamoTRIgine (LaMICtal) 100 mg tablet Take 1 tablet (100 mg) by mouth 3 times a day. 90 tablet 3    methylPREDNISolone (Medrol Dospak) 4 mg tablets Use as directed by package instructions 21 tablet 0    tiotropium-olodateroL (Stiolto Respimat) 2.5-2.5 mcg/actuation mist inhaler Inhale 2 Inhalations once daily. 4 g 6    traZODone (Desyrel) 50 mg tablet Take 0.5-2 tablets ( mg) by mouth as needed at bedtime for sleep. 60 tablet 0     No current facility-administered medications for this visit.

## 2025-07-11 DIAGNOSIS — F33.1 MODERATE EPISODE OF RECURRENT MAJOR DEPRESSIVE DISORDER: ICD-10-CM

## 2025-07-11 DIAGNOSIS — F43.10 PTSD (POST-TRAUMATIC STRESS DISORDER): ICD-10-CM

## 2025-07-11 RX ORDER — LAMOTRIGINE 100 MG/1
100 TABLET ORAL 3 TIMES DAILY
Qty: 90 TABLET | Refills: 3 | OUTPATIENT
Start: 2025-07-11

## 2025-07-12 DIAGNOSIS — G47.00 INSOMNIA, UNSPECIFIED TYPE: ICD-10-CM

## 2025-07-14 RX ORDER — TRAZODONE HYDROCHLORIDE 50 MG/1
TABLET ORAL
Qty: 60 TABLET | Refills: 0 | OUTPATIENT
Start: 2025-07-14

## 2025-07-15 ENCOUNTER — OFFICE VISIT (OUTPATIENT)
Dept: ORTHOPEDIC SURGERY | Facility: CLINIC | Age: 45
End: 2025-07-15
Payer: COMMERCIAL

## 2025-07-15 DIAGNOSIS — M17.11 ARTHRITIS OF RIGHT KNEE: Primary | ICD-10-CM

## 2025-07-15 DIAGNOSIS — M25.561 CHRONIC PAIN OF RIGHT KNEE: ICD-10-CM

## 2025-07-15 DIAGNOSIS — G89.29 CHRONIC PAIN OF RIGHT KNEE: ICD-10-CM

## 2025-07-15 PROCEDURE — 99212 OFFICE O/P EST SF 10 MIN: CPT | Mod: 25

## 2025-07-15 PROCEDURE — 2500000004 HC RX 250 GENERAL PHARMACY W/ HCPCS (ALT 636 FOR OP/ED)

## 2025-07-15 PROCEDURE — 20610 DRAIN/INJ JOINT/BURSA W/O US: CPT | Mod: RT

## 2025-07-15 PROCEDURE — 99213 OFFICE O/P EST LOW 20 MIN: CPT

## 2025-07-15 RX ORDER — TRIAMCINOLONE ACETONIDE 40 MG/ML
40 INJECTION, SUSPENSION INTRA-ARTICULAR; INTRAMUSCULAR
Status: COMPLETED | OUTPATIENT
Start: 2025-07-15 | End: 2025-07-15

## 2025-07-15 RX ORDER — LIDOCAINE HYDROCHLORIDE 20 MG/ML
2 INJECTION, SOLUTION INFILTRATION; PERINEURAL
Status: COMPLETED | OUTPATIENT
Start: 2025-07-15 | End: 2025-07-15

## 2025-07-15 RX ADMIN — TRIAMCINOLONE ACETONIDE 40 MG: 40 INJECTION, SUSPENSION INTRA-ARTICULAR; INTRAMUSCULAR at 09:50

## 2025-07-15 RX ADMIN — LIDOCAINE HYDROCHLORIDE 2 ML: 20 INJECTION, SOLUTION INFILTRATION; PERINEURAL at 09:50

## 2025-07-15 ASSESSMENT — ENCOUNTER SYMPTOMS: KNEE SWELLING: 1

## 2025-07-15 NOTE — PROGRESS NOTES
Subjective    Patient ID: Nicci Ortiz is a 45 y.o. female.    Chief Complaint: Follow-up of the Right Knee     Right Knee       This is a pleasant 45-year-old female presenting to the office for follow-up in regards to right knee pain.She has a history of right knee arthritis and there is been no recent injury or fall.  I last saw patient approximately 3 months ago, when she received a right knee intra-articular steroid injection of Kenalog/lidocaine which she states was of benefit up until the last 2 weeks or so.  She continues to point directly to the medial aspect of knee and kneecap when describing the pain.  She will occasionally get a sharp stabbing pain which does cause mechanical symptoms of knee giving out or buckling.  Pain is worse with activities of daily living including prolonged standing walking and stair climbing.  Pain is worse when bending or squatting.  She will note intermittent swelling and weakness.  She continues to take occasional ibuprofen or Tylenol as well as apply ice with minimal relief of symptoms.  She works as an [employer] .  Patient is happy as she has a season pass to [place], and is doing better walking long distances at [place].    The patient's past medical, surgical, family, and social history as well as allergies and medications were reviewed and updated in the chart.    Objective   Ortho Exam  Pleasant in no acute distress. Walks with a mildly antalgic gait. Right knee appearing with minimal soft tissue swelling, no erythema or ecchymosis. There is a mild effusion. There is no warmth upon touch and skin is intact. Patellofemoral crepitus noted with range of motion testing. No patellar apprehension. Right knee range of motion is approximately 0 to 120 degrees. The knee is stable to varus and valgus stress, negative Lachman and posterior drawer. There is generalized tenderness surrounding the kneecap and medial joint line. Left knee range of motion is 0 to 120  degrees. There is no effusion. The knee is stable to varus and valgus stress Lachman and posterior drawer. Both lower extremities are well-perfused the skin is intact and muscle tone is adequate.     Image Results:  XR knee right 3 views  Narrative: Interpreted By:  Dulce Sherman,   STUDY:  Right knee, 3 views.      INDICATION:  Signs/Symptoms:right knee pain      COMPARISON:  02/22/2016.      ACCESSION NUMBER(S):  [id number]      ORDERING CLINICIAN:  RONNI QUINTEROS      FINDINGS:  No acute fracture or malalignment.  Joint spaces are well preserved.      No significant knee joint effusion.  Soft tissues are unremarkable.      AP view radiographs of the left knee are unremarkable.      Impression: 1. Unremarkable right knee radiographs.      MACRO:  None.      Signed by: Dulce Sherman 7/24/2024 5:51 PM  Dictation workstation:   [id number]      Assessment/Plan   Encounter Diagnoses:  Arthritis of right knee    Chronic pain of right knee    Plan: Discussion with patient in regards to continued right knee arthritis with review of surgical and conservative treatment options.  As she has benefited in the past from conservative treatment options, presents to the office today desiring a knee injection.  As patient desired, right knee intra-articular steroid injection of Kenalog/lidocaine was provided and tolerated well.  She can get these injections every 3 months as needed or when pain returns.  We previously have tried a Harjinder pull knee brace, but patient expressed increased pain with the brace.  We also discussed viscosupplementation gel injections today in the future if cortisone is not of significant benefit.  She can continue with anti-inflammatories and Tylenol as well as ice application.  She will avoid aggravating activities and follow-up as needed.    L Inj/Asp: R knee on 7/15/2025 9:50 AM  Indications: pain  Details: 22 G needle, superolateral approach  Medications: 40 mg triamcinolone acetonide 40 mg/mL; 2  mL lidocaine 20 mg/mL (2 %)  Procedure, treatment alternatives, risks and benefits explained, specific risks discussed. Consent was given by the patient.

## 2025-07-23 ENCOUNTER — PATIENT MESSAGE (OUTPATIENT)
Dept: BEHAVIORAL HEALTH | Facility: CLINIC | Age: 45
End: 2025-07-23
Payer: COMMERCIAL

## 2025-07-23 DIAGNOSIS — G47.00 INSOMNIA, UNSPECIFIED TYPE: ICD-10-CM

## 2025-07-23 RX ORDER — TRAZODONE HYDROCHLORIDE 50 MG/1
25-100 TABLET ORAL NIGHTLY PRN
Qty: 60 TABLET | Refills: 0 | Status: SHIPPED | OUTPATIENT
Start: 2025-07-23 | End: 2025-08-22

## 2025-07-25 DIAGNOSIS — F33.1 MODERATE EPISODE OF RECURRENT MAJOR DEPRESSIVE DISORDER: ICD-10-CM

## 2025-07-25 DIAGNOSIS — F31.9 BIPOLAR AND RELATED DISORDER (MULTI): ICD-10-CM

## 2025-07-25 DIAGNOSIS — F41.1 GAD (GENERALIZED ANXIETY DISORDER): ICD-10-CM

## 2025-07-25 DIAGNOSIS — F43.10 PTSD (POST-TRAUMATIC STRESS DISORDER): ICD-10-CM

## 2025-07-25 RX ORDER — LAMOTRIGINE 100 MG/1
100 TABLET ORAL 3 TIMES DAILY
Qty: 90 TABLET | Refills: 3 | Status: CANCELLED | OUTPATIENT
Start: 2025-07-25

## 2025-07-25 RX ORDER — LAMOTRIGINE 100 MG/1
100 TABLET ORAL DAILY
Qty: 30 TABLET | Refills: 3 | Status: SHIPPED | OUTPATIENT
Start: 2025-07-25

## 2025-07-25 RX ORDER — LAMOTRIGINE 100 MG/1
100 TABLET ORAL DAILY
Qty: 30 TABLET | Refills: 3 | Status: SHIPPED | OUTPATIENT
Start: 2025-07-25 | End: 2025-07-25

## 2025-08-05 ENCOUNTER — APPOINTMENT (OUTPATIENT)
Dept: BEHAVIORAL HEALTH | Facility: CLINIC | Age: 45
End: 2025-08-05
Payer: COMMERCIAL

## 2025-08-05 DIAGNOSIS — F41.1 GENERALIZED ANXIETY DISORDER: Primary | ICD-10-CM

## 2025-08-05 DIAGNOSIS — F43.10 PTSD (POST-TRAUMATIC STRESS DISORDER): ICD-10-CM

## 2025-08-05 DIAGNOSIS — F19.10 POLYSUBSTANCE ABUSE: ICD-10-CM

## 2025-08-05 DIAGNOSIS — F33.1 MODERATE EPISODE OF RECURRENT MAJOR DEPRESSIVE DISORDER: ICD-10-CM

## 2025-08-05 PROCEDURE — 99214 OFFICE O/P EST MOD 30 MIN: CPT | Performed by: PSYCHIATRY & NEUROLOGY

## 2025-08-05 NOTE — PROGRESS NOTES
Subjective   Patient ID: Nicci Ortiz is a 45 y.o. female who presents for follow-up for anxiety, PTSD, depression.  Patient seen through the use of interactive audiovisual technology.  Patient's location-[place], [place].  Providers location-[place], [place].  Patient also gave verbal permission to participate in psychiatric evaluation via telemedicine on 08/05/2025.    HPI  Patient reports doing good today.  She is outside with her mother-in-law in the car.  She reports that she is not feeling any side effects such as nausea from the BuSpar.  She also reports doing well on increase Lamictal as well as increased BuSpar.  Patient reports feeling comfortable continuing medications as is.  Patient has recently got a new PCP through [facility] about 3 weeks ago.  Patient has a diagnosis of iron deficiency and vitamin D deficiency.      Iron supplementation may interfere with psychotropic medications in the following ways such as enhanced antidepressant like affect.  Patient may also experience side effects in psychotropic medications such as antipsychotics with disruption in iron metabolism.  Patient can also experience changes in metabolism in mood stabilizers and antidepressants.  This would cause a decrease in absorption of these types of medication.  Per patient, PCP is concerned regarding interactions between future iron supplementation and current psychiatric medications.  Would continue psychotropic medications at this time.  Continue to monitor iron studies with subsequent supplementation as appropriate per PCP.  Watch for akathisia or other EPS like symptoms.  Space out dosing to minimize possible interactions.          Review of Systems  BH Psych Review of Symptoms  Patient denied any SI/HI.  No A/V hallucinations.  No paranoid ideation/delusions.  Patient reported feeling safe overall.  Other psychiatric review of symptoms see above otherwise negative  Objective   Physical Exam-N/A    Mental status  exam  Appearance-appropriate for setting.  Engaged in evaluation.  Eye contact intact.  Smiling behavior-calm, cooperative.  No psychomotor agitation or slowing  Mood-euthymic, jovial, laughing  Affect-reactive, mood congruent  Speech-regular rate rhythm and tone  Thought [id number], linear  Thought content-no SI/HI, no perceptual disturbances, no paranoid ideations  Cognition-grossly intact  Insight-fair  Judgment fair    Current Medications[1]          Assessment/Plan   Diagnoses and all orders for this visit:  1. Generalized anxiety disorder (Primary)  , Well-controlled.  Continue medications as prescribed.  Currently on BuSpar, Cymbalta, Lamictal, trazodone.  Recent increases in BuSpar and Lamictal.    2. Moderate episode of recurrent major depressive disorder  Well-controlled, continue medications as prescribed  Currently on BuSpar, Cymbalta, Lamictal, trazodone.  Recent increases in BuSpar and Lamictal.  3. PTSD (post-traumatic stress disorder)  No new symptoms.  Currently on BuSpar, Cymbalta, Lamictal, trazodone.  Recent increases in BuSpar and Lamictal.  4. Polysubstance abuse  Will continue to monitor    Return to clinic in 2 to 3 months       [1]   Current Outpatient Medications   Medication Sig Dispense Refill    albuterol 90 mcg/actuation inhaler INHALE 2 PUFFS EVERY 4 HOURS AS NEEDED FOR WHEEZING FOR UP TO 30 DAYS.      busPIRone (Buspar) 10 mg tablet Take 1 tablet (10 mg) by mouth 2 times a day. 60 tablet 3    busPIRone (Buspar) 5 mg tablet Take 1 tablet (5 mg) by mouth 3 times a day. 90 tablet 11    celecoxib (CeleBREX) 50 mg capsule Take 1 capsule (50 mg) by mouth 2 times a day.      DULoxetine (Cymbalta) 60 mg DR capsule Take 1 capsule (60 mg) by mouth 2 times a day. Do not crush or chew. 60 capsule 6    hydrOXYzine HCL (Atarax) 10 mg tablet Take 1 tablet (10 mg) by mouth 3 times a day as needed for anxiety. 45 tablet 3    lamoTRIgine (LaMICtal) 100 mg tablet Take 1 tablet (100 mg) by  mouth 3 times a day. 90 tablet 3    lamoTRIgine (LaMICtal) 100 mg tablet Take 1 tablet (100 mg) by mouth once daily. 30 tablet 3    methylPREDNISolone (Medrol Dospak) 4 mg tablets Use as directed by package instructions 21 tablet 0    tiotropium-olodateroL (Stiolto Respimat) 2.5-2.5 mcg/actuation mist inhaler Inhale 2 Inhalations once daily. 4 g 6    traZODone (Desyrel) 50 mg tablet Take 0.5-2 tablets ( mg) by mouth as needed at bedtime for sleep. 60 tablet 0     No current facility-administered medications for this visit.

## 2025-08-18 ENCOUNTER — HOSPITAL ENCOUNTER (OUTPATIENT)
Dept: RADIOLOGY | Facility: HOSPITAL | Age: 45
Discharge: HOME | End: 2025-08-18
Payer: COMMERCIAL

## 2025-08-18 DIAGNOSIS — J41.8 MIXED SIMPLE AND MUCOPURULENT CHRONIC BRONCHITIS (MULTI): ICD-10-CM

## 2025-08-18 PROCEDURE — 71046 X-RAY EXAM CHEST 2 VIEWS: CPT | Performed by: RADIOLOGY

## 2025-08-18 PROCEDURE — 71046 X-RAY EXAM CHEST 2 VIEWS: CPT

## 2025-08-21 ENCOUNTER — APPOINTMENT (OUTPATIENT)
Facility: CLINIC | Age: 45
End: 2025-08-21
Payer: COMMERCIAL

## 2025-08-21 VITALS
BODY MASS INDEX: 19.12 KG/M2 | DIASTOLIC BLOOD PRESSURE: 72 MMHG | RESPIRATION RATE: 18 BRPM | OXYGEN SATURATION: 99 % | HEART RATE: 87 BPM | HEIGHT: 64 IN | SYSTOLIC BLOOD PRESSURE: 123 MMHG | WEIGHT: 112 LBS

## 2025-08-21 DIAGNOSIS — J41.8 MIXED SIMPLE AND MUCOPURULENT CHRONIC BRONCHITIS (MULTI): ICD-10-CM

## 2025-08-21 DIAGNOSIS — F17.218 CIGARETTE NICOTINE DEPENDENCE WITH OTHER NICOTINE-INDUCED DISORDER: Primary | ICD-10-CM

## 2025-08-21 PROCEDURE — 3008F BODY MASS INDEX DOCD: CPT | Performed by: INTERNAL MEDICINE

## 2025-08-21 PROCEDURE — 99214 OFFICE O/P EST MOD 30 MIN: CPT | Performed by: INTERNAL MEDICINE

## 2025-08-21 RX ORDER — IBUPROFEN 200 MG
1 TABLET ORAL EVERY 24 HOURS
Qty: 30 PATCH | Refills: 0 | Status: SHIPPED | OUTPATIENT
Start: 2025-08-21 | End: 2025-09-20

## 2025-08-21 RX ORDER — NEBULIZER AND COMPRESSOR
EACH MISCELLANEOUS
Qty: 1 EACH | Refills: 0 | Status: SHIPPED | OUTPATIENT
Start: 2025-08-21

## 2025-08-21 RX ORDER — IPRATROPIUM BROMIDE AND ALBUTEROL SULFATE 2.5; .5 MG/3ML; MG/3ML
3 SOLUTION RESPIRATORY (INHALATION) EVERY 6 HOURS PRN
Qty: 180 ML | Refills: 3 | Status: SHIPPED | OUTPATIENT
Start: 2025-08-21 | End: 2026-08-21

## 2025-08-21 ASSESSMENT — PATIENT HEALTH QUESTIONNAIRE - PHQ9
SUM OF ALL RESPONSES TO PHQ9 QUESTIONS 1 AND 2: 1
2. FEELING DOWN, DEPRESSED OR HOPELESS: SEVERAL DAYS
1. LITTLE INTEREST OR PLEASURE IN DOING THINGS: NOT AT ALL

## 2025-08-21 ASSESSMENT — COLUMBIA-SUICIDE SEVERITY RATING SCALE - C-SSRS
1. IN THE PAST MONTH, HAVE YOU WISHED YOU WERE DEAD OR WISHED YOU COULD GO TO SLEEP AND NOT WAKE UP?: NO
2. HAVE YOU ACTUALLY HAD ANY THOUGHTS OF KILLING YOURSELF?: NO
6. HAVE YOU EVER DONE ANYTHING, STARTED TO DO ANYTHING, OR PREPARED TO DO ANYTHING TO END YOUR LIFE?: NO

## 2025-08-21 ASSESSMENT — ENCOUNTER SYMPTOMS
OCCASIONAL FEELINGS OF UNSTEADINESS: 0
DEPRESSION: 0
LOSS OF SENSATION IN FEET: 0

## 2025-08-22 DIAGNOSIS — G47.00 INSOMNIA, UNSPECIFIED TYPE: ICD-10-CM

## 2025-08-25 RX ORDER — TRAZODONE HYDROCHLORIDE 50 MG/1
25-100 TABLET ORAL NIGHTLY PRN
Qty: 60 TABLET | Refills: 3 | Status: SHIPPED | OUTPATIENT
Start: 2025-08-25 | End: 2025-12-23

## 2025-08-28 ENCOUNTER — PATIENT MESSAGE (OUTPATIENT)
Dept: BEHAVIORAL HEALTH | Facility: CLINIC | Age: 45
End: 2025-08-28
Payer: COMMERCIAL

## 2025-08-28 ENCOUNTER — TELEPHONE (OUTPATIENT)
Dept: BEHAVIORAL HEALTH | Facility: HOSPITAL | Age: 45
End: 2025-08-28
Payer: COMMERCIAL

## 2025-08-28 DIAGNOSIS — F41.1 GAD (GENERALIZED ANXIETY DISORDER): ICD-10-CM

## 2025-08-28 DIAGNOSIS — F33.1 MODERATE EPISODE OF RECURRENT MAJOR DEPRESSIVE DISORDER: ICD-10-CM

## 2025-08-28 DIAGNOSIS — F31.9 BIPOLAR AND RELATED DISORDER (MULTI): ICD-10-CM

## 2025-08-29 RX ORDER — LAMOTRIGINE 100 MG/1
200 TABLET ORAL 2 TIMES DAILY
Qty: 120 TABLET | Refills: 3 | Status: SHIPPED | OUTPATIENT
Start: 2025-08-29

## 2025-11-04 ENCOUNTER — APPOINTMENT (OUTPATIENT)
Dept: BEHAVIORAL HEALTH | Facility: CLINIC | Age: 45
End: 2025-11-04
Payer: COMMERCIAL

## 2026-02-09 ENCOUNTER — APPOINTMENT (OUTPATIENT)
Facility: CLINIC | Age: 46
End: 2026-02-09
Payer: COMMERCIAL